# Patient Record
Sex: FEMALE | Race: WHITE
[De-identification: names, ages, dates, MRNs, and addresses within clinical notes are randomized per-mention and may not be internally consistent; named-entity substitution may affect disease eponyms.]

---

## 2020-02-11 ENCOUNTER — HOSPITAL ENCOUNTER (OUTPATIENT)
Dept: HOSPITAL 90 - DAH | Age: 55
Discharge: HOME | End: 2020-02-11
Attending: INTERNAL MEDICINE
Payer: COMMERCIAL

## 2020-02-11 VITALS — DIASTOLIC BLOOD PRESSURE: 84 MMHG | SYSTOLIC BLOOD PRESSURE: 134 MMHG

## 2020-02-11 VITALS — SYSTOLIC BLOOD PRESSURE: 124 MMHG | DIASTOLIC BLOOD PRESSURE: 64 MMHG

## 2020-02-11 VITALS — WEIGHT: 293 LBS | BODY MASS INDEX: 44.41 KG/M2 | HEIGHT: 68 IN

## 2020-02-11 VITALS — SYSTOLIC BLOOD PRESSURE: 123 MMHG | DIASTOLIC BLOOD PRESSURE: 53 MMHG

## 2020-02-11 VITALS — DIASTOLIC BLOOD PRESSURE: 79 MMHG | SYSTOLIC BLOOD PRESSURE: 136 MMHG

## 2020-02-11 DIAGNOSIS — Z12.11: Primary | ICD-10-CM

## 2020-02-11 DIAGNOSIS — J45.31: ICD-10-CM

## 2020-02-11 DIAGNOSIS — E66.01: ICD-10-CM

## 2020-02-11 DIAGNOSIS — Z82.5: ICD-10-CM

## 2020-02-11 DIAGNOSIS — K21.9: ICD-10-CM

## 2020-02-11 DIAGNOSIS — Z90.49: ICD-10-CM

## 2020-02-11 DIAGNOSIS — Z72.89: ICD-10-CM

## 2020-02-11 DIAGNOSIS — Z79.899: ICD-10-CM

## 2020-02-11 DIAGNOSIS — F17.210: ICD-10-CM

## 2020-02-11 DIAGNOSIS — Z90.710: ICD-10-CM

## 2020-02-11 DIAGNOSIS — F41.9: ICD-10-CM

## 2020-02-11 DIAGNOSIS — Z82.49: ICD-10-CM

## 2020-02-11 DIAGNOSIS — Z80.0: ICD-10-CM

## 2020-02-11 DIAGNOSIS — Z79.2: ICD-10-CM

## 2020-02-11 DIAGNOSIS — K63.5: ICD-10-CM

## 2020-02-11 DIAGNOSIS — F32.9: ICD-10-CM

## 2020-02-11 PROCEDURE — 45378 DIAGNOSTIC COLONOSCOPY: CPT

## 2020-02-11 NOTE — NUR
PT LEFT VIA WHEELCHAIR IN PVT CAR. D/C INSTRUCTIONS GIVEN TO FAMILY FRIEND WITH FU APPT. V/S 
STABLE N-O COMPLICATIONS UPON D.C

## 2020-02-12 ENCOUNTER — HOSPITAL ENCOUNTER (OUTPATIENT)
Dept: HOSPITAL 90 - DAH | Age: 55
Discharge: HOME | End: 2020-02-12
Attending: INTERNAL MEDICINE
Payer: COMMERCIAL

## 2020-02-12 VITALS — DIASTOLIC BLOOD PRESSURE: 83 MMHG | SYSTOLIC BLOOD PRESSURE: 131 MMHG

## 2020-02-12 VITALS — SYSTOLIC BLOOD PRESSURE: 108 MMHG | DIASTOLIC BLOOD PRESSURE: 72 MMHG

## 2020-02-12 VITALS — SYSTOLIC BLOOD PRESSURE: 142 MMHG | DIASTOLIC BLOOD PRESSURE: 77 MMHG

## 2020-02-12 VITALS — DIASTOLIC BLOOD PRESSURE: 68 MMHG | SYSTOLIC BLOOD PRESSURE: 124 MMHG

## 2020-02-12 VITALS — DIASTOLIC BLOOD PRESSURE: 74 MMHG | SYSTOLIC BLOOD PRESSURE: 126 MMHG

## 2020-02-12 VITALS — SYSTOLIC BLOOD PRESSURE: 139 MMHG | DIASTOLIC BLOOD PRESSURE: 79 MMHG

## 2020-02-12 VITALS — DIASTOLIC BLOOD PRESSURE: 66 MMHG | SYSTOLIC BLOOD PRESSURE: 107 MMHG

## 2020-02-12 VITALS — WEIGHT: 293 LBS | BODY MASS INDEX: 44.41 KG/M2 | HEIGHT: 68 IN

## 2020-02-12 VITALS — DIASTOLIC BLOOD PRESSURE: 78 MMHG | SYSTOLIC BLOOD PRESSURE: 129 MMHG

## 2020-02-12 DIAGNOSIS — F32.9: ICD-10-CM

## 2020-02-12 DIAGNOSIS — K21.9: ICD-10-CM

## 2020-02-12 DIAGNOSIS — K63.5: ICD-10-CM

## 2020-02-12 DIAGNOSIS — F17.210: ICD-10-CM

## 2020-02-12 DIAGNOSIS — E66.01: ICD-10-CM

## 2020-02-12 DIAGNOSIS — Z72.89: ICD-10-CM

## 2020-02-12 DIAGNOSIS — Z98.890: ICD-10-CM

## 2020-02-12 DIAGNOSIS — Z90.710: ICD-10-CM

## 2020-02-12 DIAGNOSIS — Z80.0: ICD-10-CM

## 2020-02-12 DIAGNOSIS — F41.9: ICD-10-CM

## 2020-02-12 DIAGNOSIS — Z82.5: ICD-10-CM

## 2020-02-12 DIAGNOSIS — Z12.11: Primary | ICD-10-CM

## 2020-02-12 DIAGNOSIS — Z90.49: ICD-10-CM

## 2020-02-12 DIAGNOSIS — J45.31: ICD-10-CM

## 2020-02-12 DIAGNOSIS — Z82.49: ICD-10-CM

## 2020-02-12 PROCEDURE — 45378 DIAGNOSTIC COLONOSCOPY: CPT

## 2020-02-14 ENCOUNTER — HOSPITAL ENCOUNTER (OUTPATIENT)
Dept: HOSPITAL 90 - DAH | Age: 55
Discharge: HOME | End: 2020-02-14
Attending: INTERNAL MEDICINE
Payer: COMMERCIAL

## 2020-02-14 VITALS — SYSTOLIC BLOOD PRESSURE: 130 MMHG | DIASTOLIC BLOOD PRESSURE: 74 MMHG

## 2020-02-14 VITALS — SYSTOLIC BLOOD PRESSURE: 130 MMHG | DIASTOLIC BLOOD PRESSURE: 70 MMHG

## 2020-02-14 VITALS — HEIGHT: 68 IN | BODY MASS INDEX: 44.41 KG/M2 | WEIGHT: 293 LBS

## 2020-02-14 VITALS — DIASTOLIC BLOOD PRESSURE: 96 MMHG | SYSTOLIC BLOOD PRESSURE: 145 MMHG

## 2020-02-14 VITALS — DIASTOLIC BLOOD PRESSURE: 85 MMHG | SYSTOLIC BLOOD PRESSURE: 138 MMHG

## 2020-02-14 VITALS — SYSTOLIC BLOOD PRESSURE: 112 MMHG | DIASTOLIC BLOOD PRESSURE: 66 MMHG

## 2020-02-14 DIAGNOSIS — F32.9: ICD-10-CM

## 2020-02-14 DIAGNOSIS — Z12.11: Primary | ICD-10-CM

## 2020-02-14 DIAGNOSIS — K63.5: ICD-10-CM

## 2020-02-14 DIAGNOSIS — Z80.0: ICD-10-CM

## 2020-02-14 DIAGNOSIS — Z86.19: ICD-10-CM

## 2020-02-14 DIAGNOSIS — J44.9: ICD-10-CM

## 2020-02-14 DIAGNOSIS — J45.31: ICD-10-CM

## 2020-02-14 DIAGNOSIS — Z90.710: ICD-10-CM

## 2020-02-14 DIAGNOSIS — F41.9: ICD-10-CM

## 2020-02-14 DIAGNOSIS — K62.1: ICD-10-CM

## 2020-02-14 DIAGNOSIS — Q43.8: ICD-10-CM

## 2020-02-14 DIAGNOSIS — Z90.49: ICD-10-CM

## 2020-02-14 PROCEDURE — 45385 COLONOSCOPY W/LESION REMOVAL: CPT

## 2020-02-14 PROCEDURE — 45380 COLONOSCOPY AND BIOPSY: CPT

## 2020-02-14 PROCEDURE — 45381 COLONOSCOPY SUBMUCOUS NJX: CPT
